# Patient Record
Sex: MALE | Race: BLACK OR AFRICAN AMERICAN | NOT HISPANIC OR LATINO | URBAN - METROPOLITAN AREA
[De-identification: names, ages, dates, MRNs, and addresses within clinical notes are randomized per-mention and may not be internally consistent; named-entity substitution may affect disease eponyms.]

---
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
## 2024-05-17 ENCOUNTER — HOSPITAL ENCOUNTER (EMERGENCY)
Facility: OTHER | Age: 19
Discharge: HOME | End: 2024-05-17
Attending: EMERGENCY MEDICINE
Payer: MEDICAID

## 2024-05-17 VITALS
OXYGEN SATURATION: 100 % | HEIGHT: 77 IN | WEIGHT: 142 LBS | SYSTOLIC BLOOD PRESSURE: 124 MMHG | TEMPERATURE: 98 F | HEART RATE: 82 BPM | RESPIRATION RATE: 19 BRPM | BODY MASS INDEX: 16.77 KG/M2 | DIASTOLIC BLOOD PRESSURE: 81 MMHG

## 2024-05-17 DIAGNOSIS — H10.13 ALLERGIC CONJUNCTIVITIS OF BOTH EYES: Primary | ICD-10-CM

## 2024-05-17 PROCEDURE — 25000003 PHARM REV CODE 250: Performed by: NURSE PRACTITIONER

## 2024-05-17 PROCEDURE — 99283 EMERGENCY DEPT VISIT LOW MDM: CPT

## 2024-05-17 RX ORDER — OLOPATADINE HYDROCHLORIDE 1 MG/ML
1 SOLUTION/ DROPS OPHTHALMIC 2 TIMES DAILY
Qty: 5 ML | Status: SHIPPED | OUTPATIENT
Start: 2024-05-17 | End: 2024-06-16

## 2024-05-17 RX ORDER — PROPARACAINE HYDROCHLORIDE 5 MG/ML
1 SOLUTION/ DROPS OPHTHALMIC
Status: COMPLETED | OUTPATIENT
Start: 2024-05-17 | End: 2024-05-17

## 2024-05-17 RX ORDER — LORATADINE 10 MG/1
10 TABLET ORAL DAILY
Qty: 30 TABLET | Refills: 0 | Status: SHIPPED | OUTPATIENT
Start: 2024-05-17 | End: 2024-06-16

## 2024-05-17 RX ADMIN — FLUORESCEIN SODIUM 1 EACH: 1 STRIP OPHTHALMIC at 09:05

## 2024-05-17 RX ADMIN — PROPARACAINE HYDROCHLORIDE 1 DROP: 5 SOLUTION/ DROPS OPHTHALMIC at 09:05

## 2024-05-17 NOTE — Clinical Note
"Chantal Noonan" Eddie was seen and treated in our emergency department on 5/17/2024.  He may return to work on 05/20/2024.       If you have any questions or concerns, please don't hesitate to call.      Samuel FORD LPN"

## 2024-05-18 NOTE — FIRST PROVIDER EVALUATION
Emergency Department TeleTriage Encounter Note      CHIEF COMPLAINT    Chief Complaint   Patient presents with    Eye Problem     C/o bilateral eye redness, clear drainage, itching that began on Monday. Stated went to [facility] on Tuesday prescribed eye drops with no improvement. Denies any change in vision/ trauma/injury. VSS       VITAL SIGNS   Initial Vitals [05/17/24 1945]   BP Pulse Resp Temp SpO2   124/81 82 19 98.4 °F (36.9 °C) 100 %      MAP       --            ALLERGIES    Review of patient's allergies indicates:  No Known Allergies    PROVIDER TRIAGE NOTE  Verbal consent for the teletriage evaluation was given by the patient at the start of the evaluation.  All efforts will be made to maintain patient's privacy during the evaluation.      This is a teletriage evaluation of a 18 y.o. male presenting to the ED with c/o bilateral eye redness with burning for 5 days.  Seen at [facility] and given eye drops with no relief. Limited physical exam via telehealth: The patient is awake, alert, answering questions appropriately and is not in respiratory distress.  As the Teletriage provider, I performed an initial assessment and ordered appropriate labs and imaging studies, if any, to facilitate the patient's care once placed in the ED. Once a room is available, care and a full evaluation will be completed by an alternate ED provider.  Any additional orders and the final disposition will be determined by that provider.  All imaging and labs will not be followed-up by the Teletriage Team, including myself.          ORDERS  Labs Reviewed - No data to display    ED Orders (720h ago, onward)      Start Ordered     Status Ordering Provider    05/17/24 2030 05/17/24 2019  fluorescein ophthalmic strip 1 each  ED 1 Time         Ordered GASTON JADE    05/17/24 2030 05/17/24 2019  proparacaine 0.5 % ophthalmic solution 1 drop  ED 1 Time         Ordered GASTON JADE    05/17/24 2020 05/17/24 2019  Visual acuity screening   Once         Ordered GASTON JADE    05/17/24 2020 05/17/24 2019  Bring Roger's Lamp to Bedside  Once         Ordered GASTON JADE              Virtual Visit Note: The provider triage portion of this emergency department evaluation and documentation was performed via [employer], a HIPAA-compliant telemedicine application, in concert with a tele-presenter in the room. A face to face patient evaluation with one of my colleagues will occur once the patient is placed in an emergency department room.      DISCLAIMER: This note was prepared with [employer] voice recognition transcription software. Garbled syntax, mangled pronouns, and other bizarre constructions may be attributed to that software system.

## 2024-05-18 NOTE — DISCHARGE INSTRUCTIONS
You have allergic conjunctivitis.  This is not infectious.  Please avoid rubbing your eyes as this is causing worsening symptoms.  Take medication as prescribed.  You will need to follow-up with allergist if symptoms do not improve

## 2024-05-18 NOTE — ED TRIAGE NOTES
Pt presents to ED today c/o redness and itching to both eyes x 4 days reports he was seen at [facility] and prescribed eye drips with no relief

## 2024-05-18 NOTE — ED PROVIDER NOTES
Encounter Date: 5/17/2024       History     Chief Complaint   Patient presents with    Eye Problem     C/o bilateral eye redness, clear drainage, itching that began on Monday. Stated went to [facility] on Tuesday prescribed eye drops with no improvement. Denies any change in vision/ trauma/injury. VSS     18-year-old male presents ER for evaluation of bilateral eye redness.  Patient reports noticing irritation and redness to the eyes bilaterally on Sunday.  States that patient's sister placed some eyedrops in his eye that day however symptoms persisted.  He reports significant itching, redness.  States he has been rubbing his eyes frequently due to the significant itching.  He does have a history of seasonal allergies and is currently not on any medication for it.  He denies any blurred vision or visual disturbance.  No use of glasses or contacts.  Patient has noticed some clear sticky drainage.    The history is provided by the patient.     Review of patient's allergies indicates:  No Known Allergies  Past Medical History:   Diagnosis Date    Mild intermittent asthma, uncomplicated      No past surgical history on file.  No family history on file.     Review of Systems   Constitutional:  Negative for chills and fever.   HENT:  Negative for congestion.    Eyes:  Positive for pain, discharge, redness and itching. Negative for photophobia and visual disturbance.   Respiratory:  Negative for shortness of breath.    Cardiovascular:  Negative for chest pain.   Gastrointestinal:  Negative for nausea and vomiting.   Genitourinary:  Negative for dysuria and flank pain.   Musculoskeletal:  Negative for myalgias.   Skin:  Negative for rash.   Allergic/Immunologic: Negative for immunocompromised state.   Neurological:  Negative for weakness and numbness.   Hematological:  Does not bruise/bleed easily.   Psychiatric/Behavioral:  Negative for confusion.        Physical Exam     Initial Vitals [05/17/24 1945]   BP Pulse Resp Temp  SpO2   124/81 82 19 98.4 °F (36.9 °C) 100 %      MAP       --         Physical Exam    Vitals reviewed.  Constitutional: He appears well-developed and well-nourished. He is not diaphoretic. No distress.   HENT:   Head: Normocephalic and atraumatic.   Eyes: EOM are normal. Pupils are equal, round, and reactive to light. Right eye exhibits chemosis. Right eye exhibits no discharge and no exudate. Left eye exhibits chemosis. Left eye exhibits no discharge and no exudate. Right conjunctiva is injected. Left conjunctiva is injected.   Neck: Neck supple.   Cardiovascular:  Intact distal pulses.           Pulmonary/Chest: No respiratory distress.   Musculoskeletal:         General: Normal range of motion.      Cervical back: Neck supple.     Neurological: He is alert and oriented to person, place, and time.         ED Course   Procedures  Labs Reviewed - No data to display       Imaging Results    None          Medications   fluorescein ophthalmic strip 1 each (1 each Both Eyes Given 5/17/24 2138)   proparacaine 0.5 % ophthalmic solution 1 drop (1 drop Both Eyes Given 5/17/24 2138)     Medical Decision Making  Differential diagnosis:    Allergic conjunctivitis, bacterial conjunctivitis, viral conjunctivitis, conjunctival hemorrhage, corneal abrasion, corneal laceration, foreign body    Risk  OTC drugs.         APC / Resident Notes:   Patient seen in the ER promptly upon arrival.  He is afebrile, no acute distress.  Injected conjunctiva bilaterally.  Chemosis noted.  Lid swelling noted likely due to excessive itching and rubbing of the eye.  Extraocular movements intact.  Visual acuity within normal limits    Alcaine and fluorescein used for eye exam.  No evidence of corneal abrasion ulceration.  Fluorescein uptake noted.  Ocular pressure to bilateral eyes were unremarkable, 17/18.     Symptoms secondary to allergic conjunctivitis.  Will prescribed home on Pataday and Claritin to use as directed.  Will give follow-up to  allergist.  Advised to refrain from rubbing the eyelids.  Given strict precautions ED.  Stable for discharge and close follow-up.                               Clinical Impression:  Final diagnoses:  [H10.13] Allergic conjunctivitis of both eyes (Primary)          ED Disposition Condition    Discharge Stable          ED Prescriptions       Medication Sig Dispense Start Date End Date Auth. Provider    olopatadine (PATANOL) 0.1 % ophthalmic solution Place 1 drop into both eyes 2 (two) times daily. 5 mL 5/17/2024 6/16/2024 Isabela Cope PA-C    loratadine (CLARITIN) 10 mg tablet Take 1 tablet (10 mg total) by mouth once daily. 30 tablet 5/17/2024 6/16/2024 Isabela Cope PA-C          Follow-up Information       Follow up With Specialties Details Why Contact Info    Located within [facility]   [address] [place] [id number]  [phone]             Isabela Cope PA-C  05/17/24 3293

## 2024-12-08 ENCOUNTER — HOSPITAL ENCOUNTER (EMERGENCY)
Facility: OTHER | Age: 19
Discharge: HOME | End: 2024-12-08
Attending: EMERGENCY MEDICINE
Payer: MEDICAID

## 2024-12-08 VITALS
SYSTOLIC BLOOD PRESSURE: 131 MMHG | RESPIRATION RATE: 18 BRPM | HEIGHT: 76 IN | TEMPERATURE: 98 F | DIASTOLIC BLOOD PRESSURE: 81 MMHG | WEIGHT: 142 LBS | HEART RATE: 75 BPM | OXYGEN SATURATION: 100 % | BODY MASS INDEX: 17.29 KG/M2

## 2024-12-08 DIAGNOSIS — Z71.1 CONCERN ABOUT STD IN MALE WITHOUT DIAGNOSIS: ICD-10-CM

## 2024-12-08 DIAGNOSIS — Z20.2 EXPOSURE TO STD: Primary | ICD-10-CM

## 2024-12-08 LAB
BACTERIA #/AREA URNS HPF: NORMAL /HPF
BILIRUB UR QL STRIP: NEGATIVE
CLARITY UR: CLEAR
COLOR UR: YELLOW
GLUCOSE UR QL STRIP: NEGATIVE
HGB UR QL STRIP: NEGATIVE
HYALINE CASTS #/AREA URNS LPF: 0 /LPF
KETONES UR QL STRIP: NEGATIVE
LEUKOCYTE ESTERASE UR QL STRIP: ABNORMAL
MICROSCOPIC COMMENT: NORMAL
NITRITE UR QL STRIP: NEGATIVE
PH UR STRIP: 7 [PH] (ref 5–8)
PROT UR QL STRIP: ABNORMAL
RBC #/AREA URNS HPF: 1 /HPF (ref 0–4)
SP GR UR STRIP: >1.03 (ref 1–1.03)
SQUAMOUS #/AREA URNS HPF: 0 /HPF
UNIDENT CRYS URNS QL MICRO: NORMAL
URN SPEC COLLECT METH UR: ABNORMAL
UROBILINOGEN UR STRIP-ACNC: ABNORMAL EU/DL
WBC #/AREA URNS HPF: 2 /HPF (ref 0–5)

## 2024-12-08 PROCEDURE — 87491 CHLMYD TRACH DNA AMP PROBE: CPT | Performed by: NURSE PRACTITIONER

## 2024-12-08 PROCEDURE — 96372 THER/PROPH/DIAG INJ SC/IM: CPT | Performed by: NURSE PRACTITIONER

## 2024-12-08 PROCEDURE — 81000 URINALYSIS NONAUTO W/SCOPE: CPT | Performed by: NURSE PRACTITIONER

## 2024-12-08 PROCEDURE — 25000003 PHARM REV CODE 250: Performed by: NURSE PRACTITIONER

## 2024-12-08 PROCEDURE — 99284 EMERGENCY DEPT VISIT MOD MDM: CPT | Mod: 25

## 2024-12-08 PROCEDURE — 63600175 PHARM REV CODE 636 W HCPCS: Performed by: NURSE PRACTITIONER

## 2024-12-08 RX ORDER — CEFTRIAXONE 500 MG/1
500 INJECTION, POWDER, FOR SOLUTION INTRAMUSCULAR; INTRAVENOUS
Status: COMPLETED | OUTPATIENT
Start: 2024-12-08 | End: 2024-12-08

## 2024-12-08 RX ORDER — DOXYCYCLINE 100 MG/1
100 CAPSULE ORAL 2 TIMES DAILY
Qty: 14 CAPSULE | Refills: 0 | Status: SHIPPED | OUTPATIENT
Start: 2024-12-08 | End: 2024-12-15

## 2024-12-08 RX ORDER — DOXYCYCLINE HYCLATE 100 MG
100 TABLET ORAL
Status: COMPLETED | OUTPATIENT
Start: 2024-12-08 | End: 2024-12-08

## 2024-12-08 RX ADMIN — DOXYCYCLINE HYCLATE 100 MG: 100 TABLET, COATED ORAL at 02:12

## 2024-12-08 RX ADMIN — CEFTRIAXONE SODIUM 500 MG: 500 INJECTION, POWDER, FOR SOLUTION INTRAMUSCULAR; INTRAVENOUS at 02:12

## 2024-12-08 NOTE — ED TRIAGE NOTES
Pt reports recent exposure to STD, no signs or symptoms, wants to be tested as a result of recent exposure. No other ailments or complaints

## 2024-12-08 NOTE — ED PROVIDER NOTES
"Source of History:  Patient    Chief complaint:  Exposure to STD (STD check no symptoms)      HPI:  Chantal Morgan is a 19 y.o. male presenting with request for STD testing, patient reports recent unprotected sex with a partner that called him today and said that she needs to be tested and treated.  Patient denies any testicle pain, swelling, discharge or dysuria.  No abdominal pain.    This is the extent to the patients complaints today here in the emergency department.    PMH:  As per HPI and below:  Past Medical History:   Diagnosis Date    Mild intermittent asthma, uncomplicated      History reviewed. No pertinent surgical history.       Review of patient's allergies indicates:  No Known Allergies    ROS: As per HPI and below:  Constitutional: No fever.  No chills.  GI:  No abdominal pain  Genitourinary:  No dysuria or discharge    Physical Exam:    BP (!) 141/84 (BP Location: Left arm)   Pulse 78   Temp 98.1 °F (36.7 °C) (Oral)   Resp 18   Ht 6' 4" (1.93 m)   Wt 64.4 kg (142 lb)   SpO2 100%   BMI 17.28 kg/m²   Vitals:    12/08/24 1240   BP: (!) 141/84   Pulse: 78   Resp: 18   Temp: 98.1 °F (36.7 °C)   TempSrc: Oral   SpO2: 100%   Weight: 64.4 kg (142 lb)   Height: 6' 4" (1.93 m)       Nurse's notes vitals reviewed  Constitutional: Patient alert and oriented well-developed well-nourished  Eyes:  Normal conjunctiva.  Extraocular muscles are intact.  ENT: Oral mucosa moist  GI:  Nontender to palpation bowel sounds normal, no distention or guarding.  Musculoskeletal: Normocephalic atraumatic, normal range of motion, no obvious deformities  Skin: Warm and dry no rashes or lesions, no ecchymosis, no erythema  Neuro: alert and oriented x3,  no focal neurological deficits.      Abnormal Labs Reviewed   URINALYSIS, REFLEX TO URINE CULTURE - Abnormal; Notable for the following components:       Result Value    Specific Gravity, UA >1.030 (*)     Protein, UA 1+ (*)     Urobilinogen, UA 2.0-3.0 (*)     Leukocytes, UA " 1+ (*)     All other components within normal limits    Narrative:     Specimen Source->Urine       No orders to display         Initial Impression/ Differential Dx:  Differential Diagnosis includes, but is not limited to:  STI, urethritis, epididymitis, orchitis, UTI, prostatitis,     MDM:    Medical Decision Making  19-year-old male with recent unprotected sex, states partner called and told him today he needs to be tested for STDs, he is unsure of what the partner tested positive for.  He denies any testicle pain or swelling or penile discharge or dysuria.  After careful evaluation of the patient's physical exam and history of present illness, I believe the patient is at risk for a sexually transmitted disease due to either possible exposure for known exposure.  I will treat the patient with Rocephin and doxycycline in the emergency department.  I discussed abstaining from sex ×2 weeks, and must inform all sexual partners of possible STD exposure.  Instructed patient to follow up with their primary care, and I provided an STD clinic contact information for continued evaluation.  Patient was allowed to ask questions, and verbalizes understanding.      Amount and/or Complexity of Data Reviewed  Labs: ordered.    Risk  Prescription drug management.             Diagnostic Impression:    1. Exposure to STD    2. Concern about STD in male without diagnosis         ED Disposition Condition    Discharge Stable            ED Prescriptions       Medication Sig Dispense Start Date End Date Auth. Provider    doxycycline (VIBRAMYCIN) 100 MG Cap Take 1 capsule (100 mg total) by mouth 2 (two) times daily. for 7 days 14 capsule 12/8/2024 12/15/2024 Karen Dailey, [facility]          Follow-up Information       Follow up With Specialties Details Why Contact Info    [facility] Emergency Dept Emergency Medicine Go to  If symptoms worsen [address] [facility] [address] [phone]             Karen Dailey,  [facility]  12/08/24 1451

## 2024-12-08 NOTE — DISCHARGE INSTRUCTIONS
No sex for the next 2 weeks, as you can pass the infection back and forth.    You need to also inform your sexual partners of the need to be tested and possibly treated.      [employer] Username  [email or web address]

## 2024-12-08 NOTE — FIRST PROVIDER EVALUATION
Emergency Department TeleTriage Encounter Note      CHIEF COMPLAINT    Chief Complaint   Patient presents with    Exposure to STD     STD check no symptoms       VITAL SIGNS   Initial Vitals [12/08/24 1240]   BP Pulse Resp Temp SpO2   (!) 141/84 78 18 98.1 °F (36.7 °C) 100 %      MAP       --            ALLERGIES    Review of patient's allergies indicates:  No Known Allergies    PROVIDER TRIAGE NOTE  Patient requesting STD testing due to unprotected sex. No symptoms or known exposure.       ORDERS  Labs Reviewed   URINALYSIS, REFLEX TO URINE CULTURE   C. TRACHOMATIS/N. GONORRHOEAE BY AMP DNA       ED Orders (720h ago, onward)      Start Ordered     Status Ordering Provider    12/08/24 1244 12/08/24 1243  C. trachomatis/N. gonorrhoeae by AMP DNA  STAT         Ordered IRINA KANG.    12/08/24 1243 12/08/24 1243  Urinalysis, Reflex to Urine Culture Urine, Clean Catch  STAT         Ordered IRINA KANG              Virtual Visit Note: The provider triage portion of this emergency department evaluation and documentation was performed via [employer], a HIPAA-compliant telemedicine application, in concert with a tele-presenter in the room. A face to face patient evaluation with one of my colleagues will occur once the patient is placed in an emergency department room.      DISCLAIMER: This note was prepared with [employer] voice recognition transcription software. Garbled syntax, mangled pronouns, and other bizarre constructions may be attributed to that software system.

## 2024-12-12 LAB
C TRACH DNA SPEC QL NAA+PROBE: NOT DETECTED
N GONORRHOEA DNA SPEC QL NAA+PROBE: NOT DETECTED